# Patient Record
Sex: MALE | Race: WHITE | NOT HISPANIC OR LATINO | Employment: FULL TIME | URBAN - METROPOLITAN AREA
[De-identification: names, ages, dates, MRNs, and addresses within clinical notes are randomized per-mention and may not be internally consistent; named-entity substitution may affect disease eponyms.]

---

## 2019-01-23 ENCOUNTER — TELEPHONE (OUTPATIENT)
Dept: UROLOGY | Facility: MEDICAL CENTER | Age: 54
End: 2019-01-23

## 2019-01-23 NOTE — TELEPHONE ENCOUNTER
Appointment made for 01/29/19 at 2:30PM in [facility]  Patient to arrive early to complete paperwork

## 2019-01-23 NOTE — TELEPHONE ENCOUNTER
Reason for appointment/Complaint/Diagnosis :  Lump on testicle    Insurance: [employer]    History of Cancer? no                       If yes, what kind? Not applicable    Previous urologist?     No                  Records requested/where? Not applicable    Outside testing/where? Not applicable    Location Preference for office visit?  [place] or [place]

## 2019-01-24 RX ORDER — METHYLPREDNISOLONE 4 MG/1
TABLET ORAL
Refills: 0 | COMMUNITY
Start: 2018-10-23

## 2019-01-24 RX ORDER — EPINEPHRINE 0.3 MG/.3ML
INJECTION SUBCUTANEOUS
Refills: 1 | COMMUNITY
Start: 2018-11-28

## 2019-01-29 ENCOUNTER — OFFICE VISIT (OUTPATIENT)
Dept: UROLOGY | Facility: CLINIC | Age: 54
End: 2019-01-29
Payer: COMMERCIAL

## 2019-01-29 VITALS
HEART RATE: 88 BPM | DIASTOLIC BLOOD PRESSURE: 72 MMHG | BODY MASS INDEX: 39.28 KG/M2 | WEIGHT: 290 LBS | HEIGHT: 72 IN | SYSTOLIC BLOOD PRESSURE: 130 MMHG

## 2019-01-29 DIAGNOSIS — N50.819 TESTICULAR PAIN: Primary | ICD-10-CM

## 2019-01-29 DIAGNOSIS — Z12.5 PROSTATE CANCER SCREENING: ICD-10-CM

## 2019-01-29 DIAGNOSIS — R39.9 LOWER URINARY TRACT SYMPTOMS (LUTS): ICD-10-CM

## 2019-01-29 LAB
SL AMB  POCT GLUCOSE, UA: NORMAL
SL AMB LEUKOCYTE ESTERASE,UA: NORMAL
SL AMB POCT BILIRUBIN,UA: NORMAL
SL AMB POCT BLOOD,UA: NORMAL
SL AMB POCT CLARITY,UA: CLEAR
SL AMB POCT COLOR,UA: YELLOW
SL AMB POCT KETONES,UA: NORMAL
SL AMB POCT NITRITE,UA: NORMAL
SL AMB POCT PH,UA: 5
SL AMB POCT SPECIFIC GRAVITY,UA: 1.03
SL AMB POCT URINE PROTEIN: NORMAL
SL AMB POCT UROBILINOGEN: NORMAL

## 2019-01-29 PROCEDURE — 99243 OFF/OP CNSLTJ NEW/EST LOW 30: CPT | Performed by: PHYSICIAN ASSISTANT

## 2019-01-29 PROCEDURE — 81002 URINALYSIS NONAUTO W/O SCOPE: CPT | Performed by: PHYSICIAN ASSISTANT

## 2019-01-29 RX ORDER — TAMSULOSIN HYDROCHLORIDE 0.4 MG/1
0.4 CAPSULE ORAL
Qty: 30 CAPSULE | Refills: 3 | Status: SHIPPED | OUTPATIENT
Start: 2019-01-29 | End: 2019-04-25

## 2019-01-29 NOTE — PROGRESS NOTES
1  Testicular pain  - POCT urine dip  - US scrotum and testicles; Future    2  Prostate cancer screening  - PSA, Total Screen; Future    3  Lower urinary tract symptoms (LUTS)  - tamsulosin (FLOMAX) 0 4 mg; Take 1 capsule (0 4 mg total) by mouth daily with dinner  Dispense: 30 capsule; Refill: 3          Assessment and plan:       1  Right epididymal cyst  - I reviewed with the patient his physical exam findings are consistent with order epididymal cyst   Will obtain a scrotal ultrasound for confirmation  Patient is not symptomatic at this time  2    Lower urinary tract symptoms  - will initiate patient on tamsulosin 0 4 mg daily  Side effect profile reviewed  Follow-up in a few months for uroflow PVR in symptom reassessment  3    Prostate cancer screening  - prostate examination in the office today without abnormalities  Patient will obtain a PSA to complete prostate cancer screening  Katiana Pulido PA-C      Chief Complaint     Chief Complaint   Patient presents with    testicular lump       History of Present Illness     Yesenia Ramirez is a 47 y o  male presenting today as a new patient in regards to the testicular lump  Patient states that he was performing a physical exam 1 month ago and noticed some right testicular swelling/lump  He denied any pain or tenderness associated with this swelling  Denies any changes in his lower urinary tract symptoms  Patient has admitted to progressive lower urinary tract symptoms of a slightly weaker stream, nocturia 2 times nightly, urgency, hesitancy, and post micturition dribbling  He has never tried any pharmacotherapy for his prostate or bladder previously  Denies any dysuria, gross hematuria, urinary infections  Review of Systems     Review of Systems   Constitutional: Negative for activity change, appetite change, chills, diaphoresis, fatigue, fever and unexpected weight change     Respiratory: Negative for chest tightness and shortness of breath  Cardiovascular: Negative for chest pain, palpitations and leg swelling  Gastrointestinal: Negative for abdominal distention, abdominal pain, constipation, diarrhea, nausea and vomiting  Genitourinary: Positive for scrotal swelling  Negative for decreased urine volume, difficulty urinating, dysuria, enuresis, flank pain, frequency, genital sores, hematuria and urgency  Nocturia 2 times, urgency, hesitancy, post micturition dribbling   Musculoskeletal: Negative for back pain, gait problem and myalgias  Skin: Negative for color change, pallor, rash and wound  Psychiatric/Behavioral: Negative for behavioral problems  The patient is not nervous/anxious  AUA SYMPTOM SCORE      Most Recent Value   AUA SYMPTOM SCORE   How often have you had a sensation of not emptying your bladder completely after you finished urinating? 1   How often have you had to urinate again less than two hours after you finished urinating? 3   How often have you found you stopped and started again several times when you urinate? 1   How often have you found it difficult to postpone urination? 4   How often have you had a weak urinary stream?  1   How often have you had to push or strain to begin urination? 0   How many times did you most typically get up to urinate from the time you went to bed at night until the time you got up in the morning? 2   Quality of Life: If you were to spend the rest of your life with your urinary condition just the way it is now, how would you feel about that?  3   AUA SYMPTOM SCORE  12          Allergies     No Known Allergies    Physical Exam     Physical Exam   Constitutional: He is oriented to person, place, and time  He appears well-developed and well-nourished  No distress  HENT:   Head: Normocephalic and atraumatic  Eyes: Conjunctivae are normal    Neck: Normal range of motion  No tracheal deviation present     Pulmonary/Chest: Effort normal    Abdominal: Hernia confirmed negative in the right inguinal area and confirmed negative in the left inguinal area  Genitourinary: Testes normal and penis normal  Right testis shows no mass and no tenderness  Left testis shows no mass and no tenderness  No phimosis, paraphimosis, hypospadias or penile erythema  Genitourinary Comments: Good rectal tone  Prostate 25g, smooth, symmetric, no palpable nodules  Physical exam findings consistent with right epididymal cyst    Musculoskeletal: Normal range of motion  He exhibits no edema or deformity  Neurological: He is alert and oriented to person, place, and time  Skin: Skin is warm and dry  No rash noted  He is not diaphoretic  No erythema  Psychiatric: He has a normal mood and affect   His behavior is normal          Vital Signs     Vitals:    01/29/19 1055   BP: 130/72   BP Location: Left arm   Patient Position: Sitting   Cuff Size: Adult   Pulse: 88   Weight: 132 kg (290 lb)   Height: 6' (1 829 m)         Current Medications       Current Outpatient Prescriptions:     EPINEPHrine (EPIPEN) 0 3 mg/0 3 mL SOAJ, INJECT 0 3 MILLILITER BY INTRAMUSCULAR ROUTE ONCE AS NEEDED FOR ANAPHYLAXIS, Disp: , Rfl: 1    methylPREDNISolone 4 MG tablet therapy pack, TAKE 6 TABLETS ON DAY 1 AS DIRECTED ON PACKAGE AND DECREASE BY 1 TAB EACH DAY FOR A TOTAL OF 6 DAYS, Disp: , Rfl: 0    tamsulosin (FLOMAX) 0 4 mg, Take 1 capsule (0 4 mg total) by mouth daily with dinner, Disp: 30 capsule, Rfl: 3      Active Problems     Patient Active Problem List   Diagnosis    Testicular pain    Prostate cancer screening         Past Medical History     Past Medical History:   Diagnosis Date    Hypertension          Surgical History     Past Surgical History:   Procedure Laterality Date    BACK SURGERY           Family History     Family History   Problem Relation Age of Onset    Cancer Father     Diabetes Father     Cancer Mother     Diabetes Mother          Social History     Social History Radiology

## 2019-02-23 LAB — HBA1C MFR BLD HPLC: 5.9 %

## 2019-02-28 ENCOUNTER — HOSPITAL ENCOUNTER (OUTPATIENT)
Dept: ULTRASOUND IMAGING | Facility: HOSPITAL | Age: 54
Discharge: HOME | End: 2019-02-28
Payer: COMMERCIAL

## 2019-02-28 DIAGNOSIS — N50.819 TESTICULAR PAIN: ICD-10-CM

## 2019-02-28 PROCEDURE — 76870 US EXAM SCROTUM: CPT

## 2019-04-25 ENCOUNTER — OFFICE VISIT (OUTPATIENT)
Dept: UROLOGY | Facility: CLINIC | Age: 54
End: 2019-04-25
Payer: COMMERCIAL

## 2019-04-25 VITALS
HEIGHT: 72 IN | BODY MASS INDEX: 37.38 KG/M2 | WEIGHT: 276 LBS | SYSTOLIC BLOOD PRESSURE: 136 MMHG | DIASTOLIC BLOOD PRESSURE: 70 MMHG | HEART RATE: 81 BPM

## 2019-04-25 DIAGNOSIS — R39.9 LOWER URINARY TRACT SYMPTOMS (LUTS): Primary | ICD-10-CM

## 2019-04-25 DIAGNOSIS — N52.9 ERECTILE DYSFUNCTION, UNSPECIFIED ERECTILE DYSFUNCTION TYPE: ICD-10-CM

## 2019-04-25 LAB — POST-VOID RESIDUAL VOLUME, ML POC: 51 ML

## 2019-04-25 PROCEDURE — 51798 US URINE CAPACITY MEASURE: CPT | Performed by: PHYSICIAN ASSISTANT

## 2019-04-25 PROCEDURE — 99213 OFFICE O/P EST LOW 20 MIN: CPT | Performed by: PHYSICIAN ASSISTANT

## 2019-04-25 RX ORDER — SILDENAFIL CITRATE 20 MG/1
20 TABLET ORAL 3 TIMES DAILY
Qty: 30 TABLET | Refills: 11 | Status: SHIPPED | OUTPATIENT
Start: 2019-04-25 | End: 2019-04-25 | Stop reason: CLARIF

## 2019-04-25 RX ORDER — CHLORAL HYDRATE 500 MG
CAPSULE ORAL
COMMUNITY

## 2019-04-25 RX ORDER — MONTELUKAST SODIUM 10 MG/1
TABLET ORAL
COMMUNITY
Start: 2019-02-05

## 2019-04-25 RX ORDER — SILDENAFIL CITRATE 20 MG/1
20 TABLET ORAL 3 TIMES DAILY
Qty: 30 TABLET | Refills: 11 | Status: SHIPPED | OUTPATIENT
Start: 2019-04-25 | End: 2020-05-27

## 2019-04-25 RX ORDER — LISINOPRIL 5 MG/1
TABLET ORAL
COMMUNITY
Start: 2019-02-19

## 2019-04-25 RX ORDER — TAMSULOSIN HYDROCHLORIDE 0.4 MG/1
0.4 CAPSULE ORAL
Qty: 90 CAPSULE | Refills: 3 | Status: SHIPPED | OUTPATIENT
Start: 2019-04-25 | End: 2020-04-03

## 2019-11-25 ENCOUNTER — EVALUATION (OUTPATIENT)
Dept: PHYSICAL THERAPY | Facility: REHABILITATION | Age: 54
End: 2019-11-25
Payer: COMMERCIAL

## 2019-11-25 DIAGNOSIS — R20.2 NUMBNESS AND TINGLING OF LEFT LOWER EXTREMITY: Primary | ICD-10-CM

## 2019-11-25 DIAGNOSIS — R20.0 NUMBNESS AND TINGLING OF LEFT LOWER EXTREMITY: Primary | ICD-10-CM

## 2019-11-25 PROCEDURE — 97162 PT EVAL MOD COMPLEX 30 MIN: CPT

## 2019-11-25 NOTE — LETTER
2019    Mariano Bailey DO  [address]  [address] [facility] [address]    Patient: Kaleb Valle   YOB: 1965   Date of Visit: 2019     Encounter Diagnosis     ICD-10-CM    1  Numbness and tingling of left lower extremity R20 0     R20 2        Dear Dr Alona Burt:    Thank you for your recent referral of Kaleb Valle  Please review the attached evaluation summary from Pradeep's recent visit  Please verify that you agree with the plan of care by signing the attached order  If you have any questions or concerns, please do not hesitate to call  I sincerely appreciate the opportunity to share in the care of one of your patients and hope to have another opportunity to work with you in the near future  Sincerely,    Arline Huang, PT      Referring Provider:      I certify that I have read the below Plan of Care and certify the need for these services furnished under this plan of treatment while under my care  Mariano Bailey DO  [address]: [phone]          PT Evaluation     Today's date: 2019  Patient name: Kaleb Valle  : 1965  MRN: [id number]  Referring provider: Molly Rahman DO  Dx:   Encounter Diagnosis     ICD-10-CM    1  Numbness and tingling of left lower extremity R20 0     R20 2                   Assessment  Assessment details: Pt is a 49 y o  male who presents to PT for evaluation of N/T present in the L great toe as well as the 2nd digit, and also N/T in the L shoulder region and hand  The pt reports N/T in the L foot began in approximately September, insidious onset  N/T in UE began approximately 6 months ago  Reports UE N/T is onset when the pt is laying in bed with cv spine in flexion as he looks at his cell phone  The pt reports no other cv symptoms or complaints  Pt reports N/T in L foot is constant, and worsened with prolonged standing   He also reports LE fatigue with prolonged standing  Pt s/s are consistent with lumbar stenosis, and a flexion DP  Pt would benefit from skilled PT to improve N/T in L LE and improve pt's functional mobility  Pt was educated regarding physical therapy diagnosis and the recommended plan of care, as well as the potential risks and benefits of treatment  Impairments: abnormal or restricted ROM, impaired physical strength, lacks appropriate home exercise program, pain with function, poor posture  and poor body mechanics    Symptom irritability: lowUnderstanding of Dx/Px/POC: good   Prognosis: good    Goals  STG (3 weeks)  1  Pt to be I in HEP  2 Pt to reduce N/T by 50%  LTG (6 weeks)  1  Pt to reduce N/T by 75%  2 Pt to improve FOTO score to predicted dc value  3  Pt to manage symptoms independently  Plan  Patient would benefit from: skilled physical therapy  Referral necessary: No  Planned modality interventions: cryotherapy, TENS, thermotherapy: hydrocollator packs and ultrasound  Planned therapy interventions: abdominal trunk stabilization, activity modification, balance, balance/weight bearing training, body mechanics training, behavior modification, joint mobilization, manual therapy, massage, neuromuscular re-education, patient education, postural training, strengthening, stretching, therapeutic activities, therapeutic exercise, graded exercise, home exercise program, flexibility and functional ROM exercises  Frequency: 2x week  Duration in visits: 12  Duration in weeks: 6  Plan of Care beginning date: 11/25/2019  Plan of Care expiration date: 1/31/2020  Treatment plan discussed with: patient        Subjective Evaluation    History of Present Illness  Date of onset: 9/25/2019  Date of surgery: 2010  Mechanism of injury: Pt reports onset of numbness in the toes which began 3 months ago  Reports numbness in the L shoulder as well as L hand which began 6 months ago  No specific ELIZABETH    Pain  No pain reported  Location: L great toe and 2nd digit, as well as dorsal aspect of the hand and fingers  Relieving factors: medications (gabapentin)  Aggravating factors: standing and walking  Progression: no change          Objective     Concurrent Complaints  Negative for night pain, disturbed sleep, dizziness, faints, headaches, nausea/motion sickness, tinnitus, trouble swallowing, difficulty breathing, shortness of breath, respiratory pain, visual change, bladder dysfunction, bowel dysfunction, saddle (S4) numbness, cardiac problem, kidney problem, gallbladder problem, stomach problem, ulcer, appendix problem, spleen problem, pancreas problem, history of cancer, history of trauma and infection    Neurological Testing     Sensation   Cervical/Thoracic   Left   Diminished: light touch    Right   Intact: light touch    Lumbar   Left   Diminished: light touch    Right   Intact: light touch    Reflexes   Left   Biceps (C5/C6): normal (2+)  Brachioradialis (C6): normal (2+)  Patellar (L4): trace (1+)  Achilles (S1): trace (1+)  Leyva's reflex: negative    Right   Biceps (C5/C6): normal (2+)  Brachioradialis (C6): normal (2+)  Patellar (L4): trace (1+)  Achilles (S1): trace (1+)  Leyva's reflex: negative    Active Range of Motion   Cervical/Thoracic Spine       Cervical    Flexion:  Restriction level: minimal  Extension:  Restriction level: minimal  Left lateral flexion:  Restriction level: moderate  Right lateral flexion:  Restriction level moderate  Right rotation:  WFL    Lumbar   Flexion:  Restriction level: moderate  Extension:  Restriction level: minimal  Left lateral flexion:  Restriction level: moderate  Right lateral flexion:  Restriction level: moderate  Left rotation:  Restriction level: minimal  Right rotation:  Restriction level: minimal    Joint Play     Hypomobile: L3, L4, L5 and S1   Mechanical Assessment    Cervical    Seated Protrusion: repeated movements   Pain location: no change  no N/T reproduced  Seated Flexion:  repeated movements  Pain location: no change    Thoracic    Lying flexion: sustained positions  Pain location: no change    Lumbar    Standing flexion: repeated movements   Pain location:no change  Standing extension: repeated movements  Pain location: no change    Strength/Myotome Testing     Left Hip   Planes of Motion   Flexion: 4+  Internal rotation: 4+    Right Hip   Planes of Motion   Flexion: 5  Internal rotation: 5    Left Knee   Flexion: 4+  Extension: 4+    Right Knee   Flexion: 5  Extension: 5    Left Ankle/Foot   Dorsiflexion: 4+  Plantar flexion: 4+  Great toe extension: 4+    Right Ankle/Foot   Dorsiflexion: 5  Plantar flexion: 5  Great toe extension: 5    Muscle Activation   Patient unable to activate left transverse abdominals, left multifidus, right transverse abdominals and right multifidus  Tests   Cervical   Positive neck flexor muscle endurance test   Negative vertical compression, lumbar distraction and craniocervical flexion test       Left   Negative Spurling's Test A and Spurling's Test B  Left Shoulder   Negative ULTT1, ULTT3 and ULTT4       Lumbar   Negative prone instability , vertical compression, SIJ compression, sacroiliac distraction, Gaenslen's  and sacral thrust       Left   Negative crossed SLR, passive SLR and slump test      Right   Negative crossed SLR, passive SLR and slump test      Left Pelvic Girdle/Sacrum   Negative: active SLR test      Right Pelvic Girdle/Sacrum   Negative: active SLR test    Neuro Exam:     Headaches   Patient reports headaches: No               Precautions:       Manual              L LE long axis distraction                                                                     Exercise Diary              Nu step warmup             Pball rollouts 3 way             B HA stretch             Laney pose stretch             Sciatic nerve glide             B UT stretch             B LS stretch             TrA in sitting             TrA in sitting with marching Multifidus press             Bridges             clamshells             Piriformis stretch                                                                                               Modalities

## 2019-11-25 NOTE — PROGRESS NOTES
PT Evaluation     Today's date: 2019  Patient name: Umair Lou  : 1965  MRN: [id number]  Referring provider: Harlan Santana DO  Dx:   Encounter Diagnosis     ICD-10-CM    1  Numbness and tingling of left lower extremity R20 0     R20 2                   Assessment  Assessment details: Pt is a 49 y o  male who presents to PT for evaluation of N/T present in the L great toe as well as the 2nd digit, and also N/T in the L shoulder region and hand  The pt reports N/T in the L foot began in approximately September, insidious onset  N/T in UE began approximately 6 months ago  Reports UE N/T is onset when the pt is laying in bed with cv spine in flexion as he looks at his cell phone  The pt reports no other cv symptoms or complaints  Pt reports N/T in L foot is constant, and worsened with prolonged standing  He also reports LE fatigue with prolonged standing  Pt s/s are consistent with lumbar stenosis, and a flexion DP  Pt would benefit from skilled PT to improve N/T in L LE and improve pt's functional mobility  Pt was educated regarding physical therapy diagnosis and the recommended plan of care, as well as the potential risks and benefits of treatment  Impairments: abnormal or restricted ROM, impaired physical strength, lacks appropriate home exercise program, pain with function, poor posture  and poor body mechanics    Symptom irritability: lowUnderstanding of Dx/Px/POC: good   Prognosis: good    Goals  STG (3 weeks)  1  Pt to be I in HEP  2 Pt to reduce N/T by 50%  LTG (6 weeks)  1  Pt to reduce N/T by 75%  2 Pt to improve FOTO score to predicted dc value  3  Pt to manage symptoms independently        Plan  Patient would benefit from: skilled physical therapy  Referral necessary: No  Planned modality interventions: cryotherapy, TENS, thermotherapy: hydrocollator packs and ultrasound  Planned therapy interventions: abdominal trunk stabilization, activity modification, balance, balance/weight bearing training, body mechanics training, behavior modification, joint mobilization, manual therapy, massage, neuromuscular re-education, patient education, postural training, strengthening, stretching, therapeutic activities, therapeutic exercise, graded exercise, home exercise program, flexibility and functional ROM exercises  Frequency: 2x week  Duration in visits: 12  Duration in weeks: 6  Plan of Care beginning date: 11/25/2019  Plan of Care expiration date: 1/31/2020  Treatment plan discussed with: patient        Subjective Evaluation    History of Present Illness  Date of onset: 9/25/2019  Date of surgery: 2010  Mechanism of injury: Pt reports onset of numbness in the toes which began 3 months ago  Reports numbness in the L shoulder as well as L hand which began 6 months ago  No specific ELIZABETH    Pain  No pain reported  Location: L great toe and 2nd digit, as well as dorsal aspect of the hand and fingers  Relieving factors: medications (gabapentin)  Aggravating factors: standing and walking  Progression: no change          Objective     Concurrent Complaints  Negative for night pain, disturbed sleep, dizziness, faints, headaches, nausea/motion sickness, tinnitus, trouble swallowing, difficulty breathing, shortness of breath, respiratory pain, visual change, bladder dysfunction, bowel dysfunction, saddle (S4) numbness, cardiac problem, kidney problem, gallbladder problem, stomach problem, ulcer, appendix problem, spleen problem, pancreas problem, history of cancer, history of trauma and infection    Neurological Testing     Sensation   Cervical/Thoracic   Left   Diminished: light touch    Right   Intact: light touch    Lumbar   Left   Diminished: light touch    Right   Intact: light touch    Reflexes   Left   Biceps (C5/C6): normal (2+)  Brachioradialis (C6): normal (2+)  Patellar (L4): trace (1+)  Achilles (S1): trace (1+)  Leyva's reflex: negative    Right   Biceps (C5/C6): normal (2+)  Brachioradialis (C6): normal (2+)  Patellar (L4): trace (1+)  Achilles (S1): trace (1+)  Leyva's reflex: negative    Active Range of Motion   Cervical/Thoracic Spine       Cervical    Flexion:  Restriction level: minimal  Extension:  Restriction level: minimal  Left lateral flexion:  Restriction level: moderate  Right lateral flexion:  Restriction level moderate  Right rotation:  WFL    Lumbar   Flexion:  Restriction level: moderate  Extension:  Restriction level: minimal  Left lateral flexion:  Restriction level: moderate  Right lateral flexion:  Restriction level: moderate  Left rotation:  Restriction level: minimal  Right rotation:  Restriction level: minimal    Joint Play     Hypomobile: L3, L4, L5 and S1   Mechanical Assessment    Cervical    Seated Protrusion: repeated movements   Pain location: no change  no N/T reproduced  Seated Flexion:  repeated movements  Pain location: no change    Thoracic    Lying flexion: sustained positions  Pain location: no change    Lumbar    Standing flexion: repeated movements   Pain location:no change  Standing extension: repeated movements  Pain location: no change    Strength/Myotome Testing     Left Hip   Planes of Motion   Flexion: 4+  Internal rotation: 4+    Right Hip   Planes of Motion   Flexion: 5  Internal rotation: 5    Left Knee   Flexion: 4+  Extension: 4+    Right Knee   Flexion: 5  Extension: 5    Left Ankle/Foot   Dorsiflexion: 4+  Plantar flexion: 4+  Great toe extension: 4+    Right Ankle/Foot   Dorsiflexion: 5  Plantar flexion: 5  Great toe extension: 5    Muscle Activation   Patient unable to activate left transverse abdominals, left multifidus, right transverse abdominals and right multifidus  Tests   Cervical   Positive neck flexor muscle endurance test   Negative vertical compression, lumbar distraction and craniocervical flexion test       Left   Negative Spurling's Test A and Spurling's Test B  Left Shoulder   Negative ULTT1, ULTT3 and ULTT4       Lumbar   Negative prone instability , vertical compression, SIJ compression, sacroiliac distraction, Gaenslen's  and sacral thrust       Left   Negative crossed SLR, passive SLR and slump test      Right   Negative crossed SLR, passive SLR and slump test      Left Pelvic Girdle/Sacrum   Negative: active SLR test      Right Pelvic Girdle/Sacrum   Negative: active SLR test    Neuro Exam:     Headaches   Patient reports headaches: No               Precautions:       Manual              L LE long axis distraction                                                                     Exercise Diary              Nu step warmup             Pball rollouts 3 way             B HA stretch             Laney pose stretch             Sciatic nerve glide             B UT stretch             B LS stretch             TrA in sitting             TrA in sitting with marching             Multifidus press             Bridges             clamshells             Piriformis stretch                                                                                               Modalities

## 2019-11-27 ENCOUNTER — TRANSCRIBE ORDERS (OUTPATIENT)
Dept: PHYSICAL THERAPY | Facility: REHABILITATION | Age: 54
End: 2019-11-27

## 2019-11-27 DIAGNOSIS — R20.0 NUMBNESS AND TINGLING OF LEFT LOWER EXTREMITY: Primary | ICD-10-CM

## 2019-11-27 DIAGNOSIS — R20.2 NUMBNESS AND TINGLING OF LEFT LOWER EXTREMITY: Primary | ICD-10-CM

## 2019-12-03 ENCOUNTER — OFFICE VISIT (OUTPATIENT)
Dept: PHYSICAL THERAPY | Facility: REHABILITATION | Age: 54
End: 2019-12-03
Payer: COMMERCIAL

## 2019-12-03 DIAGNOSIS — R20.0 NUMBNESS AND TINGLING OF LEFT LOWER EXTREMITY: Primary | ICD-10-CM

## 2019-12-03 DIAGNOSIS — R20.2 NUMBNESS AND TINGLING OF LEFT LOWER EXTREMITY: Primary | ICD-10-CM

## 2019-12-03 PROCEDURE — 97112 NEUROMUSCULAR REEDUCATION: CPT

## 2019-12-03 PROCEDURE — 97110 THERAPEUTIC EXERCISES: CPT

## 2019-12-03 NOTE — PROGRESS NOTES
Daily Note     Today's date: 12/3/2019  Patient name: Joaquim Pang  : 1965  MRN: [id number]  Referring provider: Dirk López DO  Dx:   Encounter Diagnosis     ICD-10-CM    1  Numbness and tingling of left lower extremity R20 0     R20 2                   Subjective: Pt reports no pain prior to treatment  Only numbness  States that he feels his numbness may have improved slightly, but feels at times it's difficult to tell  Objective: See treatment diary below    Assessment: Tolerated treatment well  Pt reported no pain or adverse effects from exercises  No change in numbness following treatment  Patient would benefit from continued PT  Plan: Continue per plan of care        Precautions:       Manual              L LE long axis distraction                                                                     Exercise Diary  12/3            Nu step warmup 10' L3            Pball rollouts 3 way 10"x10            B HA stretch 30"x3 ea            Laney pose stretch             L Sciatic nerve stretch 5"x10            B UT stretch 15"x3            B LS stretch 15"x3            TrA in supine with kicks 2x10 ea            TrA in supine with marching             Multifidus press 1x15 ea otb            Bridges 5" 2x10            clamshells             Piriformis stretch                                                                                               Modalities

## 2019-12-05 ENCOUNTER — OFFICE VISIT (OUTPATIENT)
Dept: PHYSICAL THERAPY | Facility: REHABILITATION | Age: 54
End: 2019-12-05
Payer: COMMERCIAL

## 2019-12-05 DIAGNOSIS — R20.0 NUMBNESS AND TINGLING OF LEFT LOWER EXTREMITY: Primary | ICD-10-CM

## 2019-12-05 DIAGNOSIS — R20.2 NUMBNESS AND TINGLING OF LEFT LOWER EXTREMITY: Primary | ICD-10-CM

## 2019-12-05 PROCEDURE — 97110 THERAPEUTIC EXERCISES: CPT

## 2019-12-05 PROCEDURE — 97112 NEUROMUSCULAR REEDUCATION: CPT

## 2019-12-05 NOTE — PROGRESS NOTES
Daily Note     Today's date: 2019  Patient name: Donny Hill  : 1965  MRN: [id number]  Referring provider: Leena Rosas DO  Dx:   Encounter Diagnosis     ICD-10-CM    1  Numbness and tingling of left lower extremity R20 0     R20 2                   Subjective: Pt reports minimal numbness in great toe pre tx  Reports no pain  Compliant with Hep  Objective: See treatment diary below    Assessment: Tolerated treatment well  Pt reported no pain or adverse effects from exercises  No change in numbness following treatment  Pt's primary complaint is stiffness in neck, which did improve post tx  Patient would benefit from continued PT  Plan: Continue per plan of care  Focus tx more on c/s nv             Manual              L LE long axis distraction                                                                     Exercise Diary  12/3 12/5           Nu step warmup 10' L3 10' L7           Pball rollouts 3 way 10"x10 10"x10           B HA stretch 30"x3 ea 30"x3           Laney pose stretch             L Sciatic nerve stretch 5"x10 5"x10           B UT stretch 15"x3 15"x3           B LS stretch 15"x3 15"x3           TrA in supine with kicks 2x10 ea 2x15 ea           TrA in supine with marching             Multifidus press 1x15 ea otb 2x15 otb           Bridges 5" 2x10 2x15           clamshells             Piriformis stretch                                                                                               Modalities

## 2019-12-10 ENCOUNTER — OFFICE VISIT (OUTPATIENT)
Dept: PHYSICAL THERAPY | Facility: REHABILITATION | Age: 54
End: 2019-12-10
Payer: COMMERCIAL

## 2019-12-10 DIAGNOSIS — R20.2 NUMBNESS AND TINGLING OF LEFT LOWER EXTREMITY: Primary | ICD-10-CM

## 2019-12-10 DIAGNOSIS — R20.0 NUMBNESS AND TINGLING OF LEFT LOWER EXTREMITY: Primary | ICD-10-CM

## 2019-12-10 PROCEDURE — 97112 NEUROMUSCULAR REEDUCATION: CPT

## 2019-12-10 PROCEDURE — 97110 THERAPEUTIC EXERCISES: CPT

## 2019-12-10 NOTE — PROGRESS NOTES
Daily Note     Today's date: 12/10/2019  Patient name: Johnathon Wellington  : 1965  MRN: [id number]  Referring provider: Elmer Corey DO  Dx:   Encounter Diagnosis     ICD-10-CM    1  Numbness and tingling of left lower extremity R20 0     R20 2                   Subjective: Pt stiffness in low back pre treatment  Pt reports numbness in great toe has not changed at all  Objective: See treatment diary below    Assessment: Tolerated treatment well  Pt reported no pain or adverse effects from exercises  No change in numbness following treatment  Improvement in pt's primary complain of stiffness following tx  Patient would benefit from continued PT  Plan: Continue per plan of care               Manual              L LE long axis distraction                                                                     Exercise Diary  12/3 12/5 12/10          Nu step warmup 10' L3 10' L7 10' L6          Pball rollouts 3 way 10"x10 10"x10 10"x10          B HA stretch 30"x3 ea 30"x3 30"x3          Laney pose stretch             L Sciatic nerve stretch 5"x10 5"x10 5"x10          B UT stretch 15"x3 15"x3 15"x3          B LS stretch 15"x3 15"x3 15"x3          TrA in supine with kicks 2x10 ea 2x15 ea 2x15 ea          Multifidus press 1x15 ea otb 2x15 otb 2x20 otb          Bridges 5" 2x10 2x15 2x20          clamshells   otb 2x15          Piriformis stretch             Chin tucks   5"x20          DNF   10"x10                                                                  Modalities

## 2019-12-12 ENCOUNTER — OFFICE VISIT (OUTPATIENT)
Dept: PHYSICAL THERAPY | Facility: REHABILITATION | Age: 54
End: 2019-12-12
Payer: COMMERCIAL

## 2019-12-12 DIAGNOSIS — R20.0 NUMBNESS AND TINGLING OF LEFT LOWER EXTREMITY: Primary | ICD-10-CM

## 2019-12-12 DIAGNOSIS — R20.2 NUMBNESS AND TINGLING OF LEFT LOWER EXTREMITY: Primary | ICD-10-CM

## 2019-12-12 PROCEDURE — 97110 THERAPEUTIC EXERCISES: CPT

## 2019-12-12 PROCEDURE — 97112 NEUROMUSCULAR REEDUCATION: CPT

## 2019-12-12 NOTE — PROGRESS NOTES
Daily Note     Today's date: 2019  Patient name: Donny Hill  : 1965  MRN: [id number]  Referring provider: Leena Rosas DO  Dx:   Encounter Diagnosis     ICD-10-CM    1  Numbness and tingling of left lower extremity R20 0     R20 2                   Subjective: Pt reports no changes in symptoms since last visit  No new complaints  Objective: See treatment diary below    Assessment: Tolerated treatment well  Pt reported no adverse effects from exercises  Tolerated progression of strengthening well  No change in numbness following treatment  Improvement in stiffness following tx  Patient would benefit from continued PT  Plan: Continue per plan of care           Manual              L LE long axis distraction                                                                     Exercise Diary  12/3 12/5 12/10 12/12         Nu step warmup 10' L3 10' L7 10' L6 10' L7         Pball rollouts 3 way 10"x10 10"x10 10"x10 10"x10         B HA stretch 30"x3 ea 30"x3 30"x3 30"x3         Laney pose stretch             L Sciatic nerve stretch 5"x10 5"x10 5"x10 5"x10         B UT stretch 15"x3 15"x3 15"x3 15"x3         B LS stretch 15"x3 15"x3 15"x3 15"x3         TrA in supine with kicks 2x10 ea 2x15 ea 2x15 ea 2x20 ea         Multifidus press 1x15 ea otb 2x15 otb 2x20 otb 2x15 gtb         Bridges 5" 2x10 2x15 2x20 2x20          clamshells   otb 2x15 otb 2x20         Piriformis stretch             Chin tucks   5"x20 5"x20         DNF   10"x10 10"x10                                                                 Modalities

## 2019-12-17 ENCOUNTER — APPOINTMENT (OUTPATIENT)
Dept: PHYSICAL THERAPY | Facility: REHABILITATION | Age: 54
End: 2019-12-17
Payer: COMMERCIAL

## 2019-12-19 ENCOUNTER — OFFICE VISIT (OUTPATIENT)
Dept: PHYSICAL THERAPY | Facility: REHABILITATION | Age: 54
End: 2019-12-19
Payer: COMMERCIAL

## 2019-12-19 DIAGNOSIS — R20.2 NUMBNESS AND TINGLING OF LEFT LOWER EXTREMITY: Primary | ICD-10-CM

## 2019-12-19 DIAGNOSIS — R20.0 NUMBNESS AND TINGLING OF LEFT LOWER EXTREMITY: Primary | ICD-10-CM

## 2019-12-19 PROCEDURE — 97112 NEUROMUSCULAR REEDUCATION: CPT

## 2019-12-19 PROCEDURE — 97110 THERAPEUTIC EXERCISES: CPT

## 2020-01-07 ENCOUNTER — APPOINTMENT (OUTPATIENT)
Dept: PHYSICAL THERAPY | Facility: REHABILITATION | Age: 55
End: 2020-01-07
Payer: COMMERCIAL

## 2020-01-09 ENCOUNTER — APPOINTMENT (OUTPATIENT)
Dept: PHYSICAL THERAPY | Facility: REHABILITATION | Age: 55
End: 2020-01-09
Payer: COMMERCIAL

## 2020-01-13 ENCOUNTER — OFFICE VISIT (OUTPATIENT)
Dept: PHYSICAL THERAPY | Facility: REHABILITATION | Age: 55
End: 2020-01-13
Payer: COMMERCIAL

## 2020-01-13 DIAGNOSIS — R20.0 NUMBNESS AND TINGLING OF LEFT LOWER EXTREMITY: Primary | ICD-10-CM

## 2020-01-13 DIAGNOSIS — R20.2 NUMBNESS AND TINGLING OF LEFT LOWER EXTREMITY: Primary | ICD-10-CM

## 2020-01-13 PROCEDURE — 97112 NEUROMUSCULAR REEDUCATION: CPT | Performed by: PHYSICAL MEDICINE & REHABILITATION

## 2020-01-13 PROCEDURE — 97110 THERAPEUTIC EXERCISES: CPT | Performed by: PHYSICAL MEDICINE & REHABILITATION

## 2020-01-13 NOTE — PROGRESS NOTES
Daily Note     Today's date: 2020  Patient name: Yasmeen Aguilar  : 1965  MRN: [id number]  Referring provider: Na Parks DO  Dx:   Encounter Diagnosis     ICD-10-CM    1  Numbness and tingling of left lower extremity R20 0     R20 2                   Subjective: Patient notes increased burning through L hip, heaviness through LLE noted specifically with ambulation during recent shopping trips  Increased soreness secondary to return to work last Friday and today  Patient with no complaints regarding RUE  Objective: See treatment diary below    Assessment: Tolerated treatment well  Challenged with TE as charted  Intermittent cueing throughout  Patient would benefit from continued PT  Plan: Continue per plan of care           Manual              L LE long axis distraction                                                                     Exercise Diary  12/3 12/5 12/10 12/12 12/19 1/13       Nu step warmup 10' L3 10' L7 10' L6 10' L7 10' L8 10' L8       Pball rollouts 3 way 10"x10 10"x10 10"x10 10"x10 10"x5 ea 5x10" ea       B HA stretch 30"x3 ea 30"x3 30"x3 30"x3 30"x3 3x30" ea       Laney pose stretch             L Sciatic nerve stretch 5"x10 5"x10 5"x10 5"x10 5"x10 10x5"       B UT stretch 15"x3 15"x3 15"x3 15"x3 15"x3        B LS stretch 15"x3 15"x3 15"x3 15"x3 15"x3        TrA in supine with kicks 2x10 ea 2x15 ea 2x15 ea 2x20 ea         Multifidus press 1x15 ea otb 2x15 otb 2x20 otb 2x15 gtb 2x15 double gtb 2x15, double GTB       Bridges 5" 2x10 2x15 2x20 2x20  2x10 hold w/ alt march 2x10 hold w/ alt march       clamshells   otb 2x15 otb 2x20 gtb 2x15 GTB, 2x15       Piriformis stretch             Chin tucks   5"x20 5"x20 5"x25        DNF   10"x10 10"x10 20"x5        deadbugs w/ pball     2x10 2x10                                                  Modalities

## 2020-01-16 ENCOUNTER — OFFICE VISIT (OUTPATIENT)
Dept: PHYSICAL THERAPY | Facility: REHABILITATION | Age: 55
End: 2020-01-16
Payer: COMMERCIAL

## 2020-01-16 DIAGNOSIS — R20.0 NUMBNESS AND TINGLING OF LEFT LOWER EXTREMITY: Primary | ICD-10-CM

## 2020-01-16 DIAGNOSIS — R20.2 NUMBNESS AND TINGLING OF LEFT LOWER EXTREMITY: Primary | ICD-10-CM

## 2020-01-16 PROCEDURE — 97110 THERAPEUTIC EXERCISES: CPT

## 2020-01-16 PROCEDURE — 97112 NEUROMUSCULAR REEDUCATION: CPT

## 2020-01-16 NOTE — PROGRESS NOTES
Daily Note     Today's date: 2020  Patient name: Annika Rivera  : 1965  MRN: [id number]  Referring provider: Bill Bolaños DO  Dx:   Encounter Diagnosis     ICD-10-CM    1  Numbness and tingling of left lower extremity R20 0     R20 2         1:1 with PTA CR 5:05- 5:45  Unbilled 5:45- 5:55  Subjective: Extremely fatigued with LE muscle soreness after another long work day  Continues with numbness into left toes 1-2  Objective: See treatment diary below    Assessment: Tolerated treatment well  Core fatigue but able to perform current POC without increased L-S or referred symptoms  Patient would benefit from continued PT  Plan: Continue per plan of care  Manual              L LE long axis distraction                                                                     Exercise Diary  12/3 12/5 12/10 12/12 12/19 1/13 1/16      Nu step warmup 10' L3 10' L7 10' L6 10' L7 10' L8 10' L8 L8  10 mins      Pball rollouts 3 way 10"x10 10"x10 10"x10 10"x10 10"x5 ea 5x10" ea 10"x5 ea  B HA stretch 30"x3 ea 30"x3 30"x3 30"x3 30"x3 3x30" ea 30"x3 ea  Laney pose stretch             L Sciatic nerve stretch 5"x10 5"x10 5"x10 5"x10 5"x10 10x5" 5"x10      B UT stretch 15"x3 15"x3 15"x3 15"x3 15"x3        B LS stretch 15"x3 15"x3 15"x3 15"x3 15"x3        TrA in supine with kicks 2x10 ea 2x15 ea 2x15 ea 2x20 ea         Multifidus press 1x15 ea otb 2x15 otb 2x20 otb 2x15 gtb 2x15 double gtb 2x15, double GTB GTB  Double  2x15      Bridges 5" 2x10 2x15 2x20 2x20  2x10 hold w/ alt march 2x10 hold w/ alt march 2x10 hold   W/ alt march      clamshells   otb 2x15 otb 2x20 gtb 2x15 GTB, 2x15 GTB  2x15      Piriformis stretch             Chin tucks   5"x20 5"x20 5"x25        DNF   10"x10 10"x10 20"x5        deadbugs w/ pball     2x10 2x10 Supine  2x10 ea                                                     Modalities

## 2020-01-21 ENCOUNTER — OFFICE VISIT (OUTPATIENT)
Dept: PHYSICAL THERAPY | Facility: REHABILITATION | Age: 55
End: 2020-01-21
Payer: COMMERCIAL

## 2020-01-21 DIAGNOSIS — R20.0 NUMBNESS AND TINGLING OF LEFT LOWER EXTREMITY: Primary | ICD-10-CM

## 2020-01-21 DIAGNOSIS — R20.2 NUMBNESS AND TINGLING OF LEFT LOWER EXTREMITY: Primary | ICD-10-CM

## 2020-01-21 PROCEDURE — 97112 NEUROMUSCULAR REEDUCATION: CPT

## 2020-01-21 PROCEDURE — 97110 THERAPEUTIC EXERCISES: CPT

## 2020-01-21 NOTE — PROGRESS NOTES
PT-Discharge     Today's date: 2020  Patient name: Radha Apodaca  : 1965  MRN: [id number]  Referring provider: Stephanie Francis DO  Dx:   Encounter Diagnosis     ICD-10-CM    1  Numbness and tingling of left lower extremity R20 0     R20 2                Subjective: Pt reports min improvements in N/T in toe  Reports improvements in fatigue and improvements in standing tolerance  Objective: See treatment diary below    Cervical     Flexion:  Restriction level: minimal  Extension:  Restriction level: minimal  Left lateral flexion:  Restriction level: min  Right lateral flexion:  Restriction level min  Right rotation:  WFL  Left rotation: [facility]     Lumbar   Flexion:  Restriction level: WNL  Extension:  Restriction level: minimal  Left lateral flexion:  Restriction level: min  Right lateral flexion:  Restriction level: min  Left rotation:  Restriction level: WNL  Right rotation:  Restriction level: WNL           Strength/Myotome Testing      Left Hip   Planes of Motion   Flexion: 5  Internal rotation: 5   Right Hip   Planes of Motion   Flexion: 5  Internal rotation: 5     Left Knee   Flexion: 5  Extension: 5     Right Knee   Flexion: 5  Extension: 5     Left Ankle/Foot   Dorsiflexion: 5  Plantar flexion: 5  Great toe extension: 5     Right Ankle/Foot   Dorsiflexion: 5  Plantar flexion: 5  Great toe extension: 5     Muscle Activation   Patient able to activate left transverse abdominals, left multifidus, right transverse abdominals and right multifidus       Tests   Cervical Negative neck flexor muscle endurance test     Assessment: Pt is a 49 y o  male who presents to PT for re-evaluation and discharge of N/T present in the L great toe as well as the 2nd digit, and also N/T in the L shoulder region and hand  Pt reports improvements in N/T as well as improved LE fatigue and standing tolerance  Improvements in cervical and lumbar mobility as well as cervical and lumbar stability and strength   Pt has made maximal gains that are likely to be made from skilled PT at this time, and therefore pt to be dc  Pt is agreeable to this  Symptom irritability: lowUnderstanding of Dx/Px/POC: good   Prognosis: good   Goals  STG (3 weeks)  1  Pt to be I in HEP  Goal met  2 Pt to reduce N/T by 50%  Goal partially met  LTG (6 weeks)  1  Pt to reduce N/T by 75%  Goal partially met  2 Pt to improve FOTO score to predicted dc value  Goal met  3  Pt to manage symptoms independently  Goal met           Plan: DC to HEP  Refer back to MD for further evaluation of N/T and possible MRI  Manual              L LE long axis distraction                                                                     Exercise Diary  12/3 12/5 12/10 12/12 12/19 1/13 1/16 1/21     Nu step warmup 10' L3 10' L7 10' L6 10' L7 10' L8 10' L8 L8  10 mins L8 10'     Pball rollouts 3 way 10"x10 10"x10 10"x10 10"x10 10"x5 ea 5x10" ea 10"x5 ea  B HA stretch 30"x3 ea 30"x3 30"x3 30"x3 30"x3 3x30" ea 30"x3 ea  Laney pose stretch             L Sciatic nerve stretch 5"x10 5"x10 5"x10 5"x10 5"x10 10x5" 5"x10      B UT stretch 15"x3 15"x3 15"x3 15"x3 15"x3        B LS stretch 15"x3 15"x3 15"x3 15"x3 15"x3        TrA in supine with kicks 2x10 ea 2x15 ea 2x15 ea 2x20 ea         Multifidus press 1x15 ea otb 2x15 otb 2x20 otb 2x15 gtb 2x15 double gtb 2x15, double GTB GTB  Double  2x15 GTB 2x15 double     Bridges 5" 2x10 2x15 2x20 2x20  2x10 hold w/ alt march 2x10 hold w/ alt march 2x10 hold   W/ alt march 2x10 hold with alt march     clamshells   otb 2x15 otb 2x20 gtb 2x15 GTB, 2x15 GTB  2x15 GTB 2x15     Piriformis stretch             Chin tucks   5"x20 5"x20 5"x25        DNF   10"x10 10"x10 20"x5        deadbugs w/ pball     2x10 2x10 Supine  2x10 ea     2x10 ea                                                Modalities

## 2020-04-03 DIAGNOSIS — R39.9 LOWER URINARY TRACT SYMPTOMS (LUTS): ICD-10-CM

## 2020-04-03 RX ORDER — TAMSULOSIN HYDROCHLORIDE 0.4 MG/1
CAPSULE ORAL
Qty: 90 CAPSULE | Refills: 3 | Status: SHIPPED | OUTPATIENT
Start: 2020-04-03

## 2020-05-27 DIAGNOSIS — N52.9 ERECTILE DYSFUNCTION, UNSPECIFIED ERECTILE DYSFUNCTION TYPE: ICD-10-CM

## 2020-05-27 RX ORDER — SILDENAFIL CITRATE 20 MG/1
TABLET ORAL
Qty: 30 TABLET | Refills: 11 | Status: SHIPPED | OUTPATIENT
Start: 2020-05-27

## 2021-03-29 DIAGNOSIS — R39.9 LOWER URINARY TRACT SYMPTOMS (LUTS): ICD-10-CM

## 2021-03-29 RX ORDER — TAMSULOSIN HYDROCHLORIDE 0.4 MG/1
CAPSULE ORAL
Qty: 90 CAPSULE | Refills: 3 | OUTPATIENT
Start: 2021-03-29

## 2021-09-09 ENCOUNTER — PROBLEM (OUTPATIENT)
Dept: URBAN - METROPOLITAN AREA CLINIC 6 | Facility: CLINIC | Age: 56
End: 2021-09-09

## 2021-09-09 DIAGNOSIS — H43.812: ICD-10-CM

## 2021-09-09 PROCEDURE — 92004 COMPRE OPH EXAM NEW PT 1/>: CPT

## 2021-09-09 ASSESSMENT — VISUAL ACUITY
OS_SC: 20/20-1
OD_SC: 20/25-2

## 2021-09-09 ASSESSMENT — TONOMETRY
OD_IOP_MMHG: 16
OS_IOP_MMHG: 17

## 2021-09-30 ENCOUNTER — FOLLOW UP (OUTPATIENT)
Dept: URBAN - METROPOLITAN AREA CLINIC 6 | Facility: CLINIC | Age: 56
End: 2021-09-30

## 2021-09-30 DIAGNOSIS — H43.812: ICD-10-CM

## 2021-09-30 PROCEDURE — 1036F TOBACCO NON-USER: CPT

## 2021-09-30 PROCEDURE — 92012 INTRM OPH EXAM EST PATIENT: CPT

## 2021-09-30 PROCEDURE — G8428 CUR MEDS NOT DOCUMENT: HCPCS

## 2021-09-30 ASSESSMENT — VISUAL ACUITY
OD_SC: 20/25
OS_SC: 20/20-1

## 2021-09-30 ASSESSMENT — TONOMETRY
OD_IOP_MMHG: 17
OS_IOP_MMHG: 16